# Patient Record
Sex: FEMALE | Race: WHITE | NOT HISPANIC OR LATINO
[De-identification: names, ages, dates, MRNs, and addresses within clinical notes are randomized per-mention and may not be internally consistent; named-entity substitution may affect disease eponyms.]

---
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
## 2019-01-22 ENCOUNTER — TRANSCRIPTION ENCOUNTER (OUTPATIENT)
Age: 19
End: 2019-01-22

## 2019-02-07 ENCOUNTER — APPOINTMENT (OUTPATIENT)
Dept: NEUROLOGY | Facility: CLINIC | Age: 19
End: 2019-02-07
Payer: COMMERCIAL

## 2019-02-07 VITALS
RESPIRATION RATE: 16 BRPM | HEIGHT: 61 IN | TEMPERATURE: 98 F | BODY MASS INDEX: 30.4 KG/M2 | DIASTOLIC BLOOD PRESSURE: 70 MMHG | HEART RATE: 67 BPM | OXYGEN SATURATION: 98 % | WEIGHT: 161 LBS | SYSTOLIC BLOOD PRESSURE: 100 MMHG

## 2019-02-07 DIAGNOSIS — G43.009 MIGRAINE W/OUT AURA, NOT INTRACTABLE, W/OUT STATUS MIGRAINOSUS: ICD-10-CM

## 2019-02-07 PROCEDURE — 99243 OFF/OP CNSLTJ NEW/EST LOW 30: CPT

## 2019-02-07 RX ORDER — DESOGESTREL AND ETHINYL ESTRADIOL 0.15-0.03
KIT ORAL
Refills: 0 | Status: ACTIVE | COMMUNITY

## 2019-02-07 NOTE — CONSULT LETTER
[Dear  ___] : Dear  [unfilled], [Consult Letter:] : I had the pleasure of evaluating your patient, [unfilled]. [Please see my note below.] : Please see my note below. [Consult Closing:] : Thank you very much for allowing me to participate in the care of this patient.  If you have any questions, please do not hesitate to contact me. [Sincerely,] : Sincerely, [FreeTextEntry2] : Yu Benavides MD\[place] [address]

## 2019-02-07 NOTE — HISTORY OF PRESENT ILLNESS
[FreeTextEntry1] :  18-year-old woman with history of recurrent throbbing headaches since childhood. Headaches are often of left retro-orbital and associated with photophobia and phonophobia. Excedrin helps. In the past to 2 months she has had 10 headaches. She has noted headaches are more frequent with increased stress from school (sophomore at  [employer], majoring in biology).\par \par At age 13 she fell from her horse and fractured lumbar spine. 2 months ago she had a L5-S1 herniated disc and underwent a lumbar laminectomy performed by Dr. Miller.\par \par She has a history of asthma controlled with Symbicort. She underwent a sinuplasty about 2 years ago. She has been on oral contraceptives for the past year. She noted no difference in frequency of headaches on the oral contraceptive.\par \par Family history reveals mother has recurrent headaches.

## 2019-02-07 NOTE — PHYSICAL EXAM
[Person] : oriented to person [Place] : oriented to place [Time] : oriented to time [Concentration Intact] : normal concentrating ability [Visual Intact] : visual attention was ~T not ~L decreased [Naming Objects] : no difficulty naming common objects [Repeating Phrases] : no difficulty repeating a phrase [Writing A Sentence] : no difficulty writing a sentence [Fluency] : fluency intact [Comprehension] : comprehension intact [Reading] : reading intact [Past History] : adequate knowledge of personal past history [Cranial Nerves Optic (II)] : visual acuity intact bilaterally,  visual fields full to confrontation, pupils equal round and reactive to light [Cranial Nerves Oculomotor (III)] : extraocular motion intact [Cranial Nerves Trigeminal (V)] : facial sensation intact symmetrically [Cranial Nerves Facial (VII)] : face symmetrical [Cranial Nerves Vestibulocochlear (VIII)] : hearing was intact bilaterally [Cranial Nerves Glossopharyngeal (IX)] : tongue and palate midline [Cranial Nerves Accessory (XI - Cranial And Spinal)] : head turning and shoulder shrug symmetric [Cranial Nerves Hypoglossal (XII)] : there was no tongue deviation with protrusion [Motor Tone] : muscle tone was normal in all four extremities [Motor Strength] : muscle strength was normal in all four extremities [No Muscle Atrophy] : normal bulk in all four extremities [Sensation Tactile Decrease] : light touch was intact [Abnormal Walk] : normal gait [Balance] : balance was intact [Past-pointing] : there was no past-pointing [Tremor] : no tremor present [2+] : Ankle jerk left 2+ [Plantar Reflex Right Only] : normal on the right [Plantar Reflex Left Only] : normal on the left [Optic Disc Abnormality] : the optic disc were normal in size and color [FreeTextEntry1] : sharp disk margins [] : the neck was supple [Heart Rate And Rhythm] : heart rate was normal and rhythm regular [Heart Sounds] : normal S1 and S2 [Heart Sounds Gallop] : no gallops [Murmurs] : no murmurs [Heart Sounds Pericardial Friction Rub] : no pericardial rub

## 2019-02-07 NOTE — ASSESSMENT
[FreeTextEntry1] : I reviewed migraine triggers and avoidance with patient. Will start coenzyme Q 10 200 mg daily for migraine prophylaxis. For any recurrence of headache she will take sumatriptan 100 mg tablet at onset of headache. Potential adverse effects reviewed. She will contact me if she has any adverse effects.\par \par Return for followup in 3 months.

## 2019-05-13 ENCOUNTER — APPOINTMENT (OUTPATIENT)
Dept: NEUROLOGY | Facility: CLINIC | Age: 19
End: 2019-05-13

## 2019-08-11 ENCOUNTER — EMERGENCY (EMERGENCY)
Facility: HOSPITAL | Age: 19
LOS: 1 days | Discharge: HOME | End: 2019-08-11
Attending: EMERGENCY MEDICINE | Admitting: EMERGENCY MEDICINE
Payer: COMMERCIAL

## 2019-08-11 VITALS
HEART RATE: 63 BPM | HEIGHT: 61 IN | RESPIRATION RATE: 17 BRPM | OXYGEN SATURATION: 100 % | SYSTOLIC BLOOD PRESSURE: 148 MMHG | DIASTOLIC BLOOD PRESSURE: 95 MMHG | TEMPERATURE: 98 F | WEIGHT: 145.06 LBS

## 2019-08-11 DIAGNOSIS — Z98.890 OTHER SPECIFIED POSTPROCEDURAL STATES: Chronic | ICD-10-CM

## 2019-08-11 PROCEDURE — 99283 EMERGENCY DEPT VISIT LOW MDM: CPT

## 2019-08-11 RX ORDER — METHOCARBAMOL 500 MG/1
2 TABLET, FILM COATED ORAL
Qty: 24 | Refills: 0
Start: 2019-08-11 | End: 2019-08-14
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
## 2019-08-11 NOTE — ED PROVIDER NOTE - NSCAREINITIATED _GEN_ER
Ana Benavidez(Resident) Full range of motion of upper and lower extremities, no joint tenderness/swelling.
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
## 2019-08-11 NOTE — ED PROVIDER NOTE - PHYSICAL EXAMINATION
Gen: AAOx3, NAD   Head: NCAT  ENT: Airway patent, moist mucous membranes, nasal passageways clear  Cardiac: Normal rate, normal rhythm, no murmurs/rubs/gallops appreciated  Respiratory: Lungs CTA B/L  Gastrointestinal: Abdomen soft, nontender, nondistended, no rebound, no guarding  MSK: No gross abnormalities, FROM of all four extremities, no edema, intact AROM of neck, no midline c-t-l spine ttp, + ttp left paraspinal cervical ttp and left trapezius ttp   HEME: Extremities warm, pulses intact and symmetrical in all four extremities  Skin: left lateral neck rash (pt states is chronic)   Neuro: No gross neurologic deficits

## 2021-01-20 ENCOUNTER — TRANSCRIPTION ENCOUNTER (OUTPATIENT)
Age: 21
End: 2021-01-20

## 2022-09-29 ENCOUNTER — NON-APPOINTMENT (OUTPATIENT)
Age: 22
End: 2022-09-29

## 2023-01-19 ENCOUNTER — APPOINTMENT (OUTPATIENT)
Dept: PAIN MANAGEMENT | Facility: CLINIC | Age: 23
End: 2023-01-19
Payer: COMMERCIAL

## 2023-01-19 ENCOUNTER — APPOINTMENT (OUTPATIENT)
Dept: PAIN MANAGEMENT | Facility: CLINIC | Age: 23
End: 2023-01-19

## 2023-01-19 VITALS
WEIGHT: 175 LBS | HEIGHT: 61 IN | DIASTOLIC BLOOD PRESSURE: 88 MMHG | BODY MASS INDEX: 33.04 KG/M2 | SYSTOLIC BLOOD PRESSURE: 139 MMHG | HEART RATE: 78 BPM

## 2023-01-19 PROCEDURE — 99215 OFFICE O/P EST HI 40 MIN: CPT

## 2023-01-19 PROCEDURE — ZZZZZ: CPT

## 2023-01-19 NOTE — ASSESSMENT
[FreeTextEntry1] : Chronic migraine\par Possible rebound\par Asthma by hx\par Non focal exam\par \par Would rec Nurtec qod and Aleve as abortive\par Educated patient on rebound.

## 2023-01-19 NOTE — HISTORY OF PRESENT ILLNESS
[FreeTextEntry1] : This is a case of a   22 -year-old right-handed female with a past medical history of asthma, GERD, chronic back pain     who presents with a chief complaint of HEADACHES.\par \par Patient reports headaches since childhood usually at a frequency of once per month. Over time, the headaches have increased to qd especially worse in summer. Currently, the headaches are 4-5 times per week \par Often wake up with them. 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 , prema, riza, eletriptan,\par Triggers: None known\par Comorbidities: intermittent anxious. \par Own horse for fun.\par Other pain conditions: None\par Imaging: MRI brain past 1-2 years. NA.\par Medications: Daily inhaler, BCP, PEPCID\par Interventions: None\par CAM therapies: None\par Family history: Noncontributory\par Allergies: NKA\par \par

## 2023-01-19 NOTE — PHYSICAL EXAM
[FreeTextEntry1] : Constitutional: No signs of distress or signs of toxicity. \par Mental Status: Alert and well oriented. Speech fluent. No aphasia. Fund of knowledge intact. \par Psychiatric: Mood stable.\par Cranial Nerve: PERRLA: No papilledema; No VFC: No Mosse. V1-3 intact. No facial asymmetry, hearing grossly intact; palate elevates symmetrically, tongue midline\par Motor:No involuntary movements noted.  Adequate bulk, tone throughout, 5/5 strength of all muscle groups \par DTR: present and symmetrical; no clonus, plantars  downgoing\par Sensory: intact to primary and secondary modalities; neg Romberg\par Cerebellar: adequate finger to nose and eel to shin bilaterally.\par Gait: non antalgic or ataxic.\par Eyes: no redness or swelling\par HEENT: intact; no signs of trauma.\par Neck: No masses noted\par Pulmonary: no respiratory distress\par Vascular: no temperature, color change or sudomotor changes.; no edema\par Musculoskeletal: examination of the cervical spine reveals no midline tenderness, range of motion full upon flexion, extension and lateral rotation. Negative facet tenderness, Negative Spurlings bilaterally. \par Skin: No rash.\par

## 2023-03-06 ENCOUNTER — OUTPATIENT (OUTPATIENT)
Dept: OUTPATIENT SERVICES | Facility: HOSPITAL | Age: 23
LOS: 1 days | End: 2023-03-06
Payer: COMMERCIAL

## 2023-03-06 VITALS
DIASTOLIC BLOOD PRESSURE: 88 MMHG | OXYGEN SATURATION: 97 % | HEIGHT: 61 IN | RESPIRATION RATE: 18 BRPM | WEIGHT: 186.95 LBS | SYSTOLIC BLOOD PRESSURE: 140 MMHG | TEMPERATURE: 98 F | HEART RATE: 84 BPM

## 2023-03-06 DIAGNOSIS — Z98.1 ARTHRODESIS STATUS: Chronic | ICD-10-CM

## 2023-03-06 DIAGNOSIS — Z98.890 OTHER SPECIFIED POSTPROCEDURAL STATES: Chronic | ICD-10-CM

## 2023-03-06 DIAGNOSIS — Z01.818 ENCOUNTER FOR OTHER PREPROCEDURAL EXAMINATION: ICD-10-CM

## 2023-03-06 DIAGNOSIS — Z11.52 ENCOUNTER FOR SCREENING FOR COVID-19: ICD-10-CM

## 2023-03-06 DIAGNOSIS — M79.671 PAIN IN RIGHT FOOT: ICD-10-CM

## 2023-03-06 DIAGNOSIS — M21.611 BUNION OF RIGHT FOOT: ICD-10-CM

## 2023-03-06 DIAGNOSIS — M20.11 HALLUX VALGUS (ACQUIRED), RIGHT FOOT: ICD-10-CM

## 2023-03-06 LAB
HCT VFR BLD CALC: 45.1 % — HIGH (ref 34.5–45)
HGB BLD-MCNC: 14 G/DL — SIGNIFICANT CHANGE UP (ref 11.5–15.5)
MCHC RBC-ENTMCNC: 25.7 PG — LOW (ref 27–34)
MCHC RBC-ENTMCNC: 31 GM/DL — LOW (ref 32–36)
MCV RBC AUTO: 82.8 FL — SIGNIFICANT CHANGE UP (ref 80–100)
NRBC # BLD: 0 /100 WBCS — SIGNIFICANT CHANGE UP (ref 0–0)
PLATELET # BLD AUTO: 278 K/UL — SIGNIFICANT CHANGE UP (ref 150–400)
RBC # BLD: 5.45 M/UL — HIGH (ref 3.8–5.2)
RBC # FLD: 14.4 % — SIGNIFICANT CHANGE UP (ref 10.3–14.5)
SARS-COV-2 RNA SPEC QL NAA+PROBE: SIGNIFICANT CHANGE UP
WBC # BLD: 8.15 K/UL — SIGNIFICANT CHANGE UP (ref 3.8–10.5)
WBC # FLD AUTO: 8.15 K/UL — SIGNIFICANT CHANGE UP (ref 3.8–10.5)

## 2023-03-06 PROCEDURE — 85027 COMPLETE CBC AUTOMATED: CPT

## 2023-03-06 PROCEDURE — U0005: CPT

## 2023-03-06 PROCEDURE — G0463: CPT

## 2023-03-06 PROCEDURE — C9803: CPT

## 2023-03-06 PROCEDURE — U0003: CPT

## 2023-03-06 RX ORDER — SODIUM CHLORIDE 9 MG/ML
3 INJECTION INTRAMUSCULAR; INTRAVENOUS; SUBCUTANEOUS EVERY 8 HOURS
Refills: 0 | Status: DISCONTINUED | OUTPATIENT
Start: 2023-03-09 | End: 2023-03-23

## 2023-03-06 NOTE — H&P PST ADULT - NSICDXPASTMEDICALHX_GEN_ALL_CORE_FT
PAST MEDICAL HISTORY:  Bunion of right foot     Frequent headaches     GERD (gastroesophageal reflux disease)     Hallux valgus (acquired), right foot     Pain in right foot

## 2023-03-06 NOTE — H&P PST ADULT - NSICDXPASTSURGICALHX_GEN_ALL_CORE_FT
PAST SURGICAL HISTORY:  History of back surgery     History of lumbar fusion     History of sinus surgery     Status post bilateral breast reduction     Status post lumbar microdiscectomy

## 2023-03-06 NOTE — H&P PST ADULT - PROBLEM SELECTOR PLAN 1
Scheduled for sx on 3/9/23. Surgical and chlorhexidine instructions reviewed w/ pt. COVID testing to be done at [facility] today.

## 2023-03-06 NOTE — H&P PST ADULT - HISTORY OF PRESENT ILLNESS
22 year-old right-handed female with PMH of asthma, GERD, chronic back pain (lumbar sx x3) and headaches who presents today to [facility] for a scheduled Right Foot Lapidus Bunionectomy on 3/9/23. Denies known hx of COVID or recent fevers, chills, cough, chest pain or SOB and feels well otherwise. COVID testing to be completed in [facility] today.

## 2023-03-06 NOTE — H&P PST ADULT - NSICDXFAMILYHX_GEN_ALL_CORE_FT
FAMILY HISTORY:  Father  Still living? No  Family history of CABG, Age at diagnosis: Age Unknown  Family history of lung cancer, Age at diagnosis: Age Unknown  Family history of thyroid cancer, Age at diagnosis: Age Unknown  FHx: kidney failure, Age at diagnosis: Age Unknown    Mother  Still living? Yes, Estimated age: Age Unknown  Family history of hyperlipidemia, Age at diagnosis: Age Unknown  Family hx of hypertension, Age at diagnosis: Age Unknown

## 2023-03-08 ENCOUNTER — TRANSCRIPTION ENCOUNTER (OUTPATIENT)
Age: 23
End: 2023-03-08

## 2023-03-09 ENCOUNTER — TRANSCRIPTION ENCOUNTER (OUTPATIENT)
Age: 23
End: 2023-03-09

## 2023-03-09 ENCOUNTER — OUTPATIENT (OUTPATIENT)
Dept: OUTPATIENT SERVICES | Facility: HOSPITAL | Age: 23
LOS: 1 days | End: 2023-03-09
Payer: COMMERCIAL

## 2023-03-09 ENCOUNTER — RESULT REVIEW (OUTPATIENT)
Age: 23
End: 2023-03-09

## 2023-03-09 VITALS
RESPIRATION RATE: 16 BRPM | TEMPERATURE: 98 F | SYSTOLIC BLOOD PRESSURE: 137 MMHG | HEART RATE: 82 BPM | DIASTOLIC BLOOD PRESSURE: 90 MMHG | OXYGEN SATURATION: 97 %

## 2023-03-09 VITALS
RESPIRATION RATE: 16 BRPM | SYSTOLIC BLOOD PRESSURE: 133 MMHG | HEIGHT: 61 IN | TEMPERATURE: 98 F | WEIGHT: 186.95 LBS | DIASTOLIC BLOOD PRESSURE: 89 MMHG | HEART RATE: 80 BPM | OXYGEN SATURATION: 98 %

## 2023-03-09 DIAGNOSIS — M79.671 PAIN IN RIGHT FOOT: ICD-10-CM

## 2023-03-09 DIAGNOSIS — Z98.890 OTHER SPECIFIED POSTPROCEDURAL STATES: Chronic | ICD-10-CM

## 2023-03-09 DIAGNOSIS — M21.611 BUNION OF RIGHT FOOT: ICD-10-CM

## 2023-03-09 DIAGNOSIS — Z98.1 ARTHRODESIS STATUS: Chronic | ICD-10-CM

## 2023-03-09 DIAGNOSIS — M20.11 HALLUX VALGUS (ACQUIRED), RIGHT FOOT: ICD-10-CM

## 2023-03-09 PROCEDURE — C1889: CPT

## 2023-03-09 PROCEDURE — 28297 COR HLX VLGS JT ARTHRD: CPT | Mod: RT

## 2023-03-09 PROCEDURE — 76000 FLUOROSCOPY <1 HR PHYS/QHP: CPT

## 2023-03-09 PROCEDURE — C1734: CPT

## 2023-03-09 PROCEDURE — 73630 X-RAY EXAM OF FOOT: CPT

## 2023-03-09 PROCEDURE — C1713: CPT

## 2023-03-09 PROCEDURE — 73630 X-RAY EXAM OF FOOT: CPT | Mod: 26,RT

## 2023-03-09 DEVICE — IMPLANTABLE DEVICE: Type: IMPLANTABLE DEVICE | Site: RIGHT | Status: FUNCTIONAL

## 2023-03-09 DEVICE — SCREW LOKG 3.5X20MM: Type: IMPLANTABLE DEVICE | Site: RIGHT | Status: FUNCTIONAL

## 2023-03-09 DEVICE — SCREW LOGK 3.5X22MM: Type: IMPLANTABLE DEVICE | Site: RIGHT | Status: FUNCTIONAL

## 2023-03-09 DEVICE — SCREW LOKG 3.5X18MM: Type: IMPLANTABLE DEVICE | Site: RIGHT | Status: FUNCTIONAL

## 2023-03-09 DEVICE — GWIRE MAXTORQUE 1.4MM: Type: IMPLANTABLE DEVICE | Site: RIGHT | Status: FUNCTIONAL

## 2023-03-09 DEVICE — BONE WAX 2.5GM: Type: IMPLANTABLE DEVICE | Site: RIGHT | Status: FUNCTIONAL

## 2023-03-09 DEVICE — K-WIRE STRYKER (SMOOTH) 4MM: Type: IMPLANTABLE DEVICE | Site: RIGHT | Status: FUNCTIONAL

## 2023-03-09 DEVICE — K-WIRE WRIGHT MEDICAL 2.5MM X 150MM: Type: IMPLANTABLE DEVICE | Site: RIGHT | Status: FUNCTIONAL

## 2023-03-09 DEVICE — SCREW CORT LO PROF 3.5X24MM: Type: IMPLANTABLE DEVICE | Site: RIGHT | Status: FUNCTIONAL

## 2023-03-09 DEVICE — PIN FIXATION TEMP 1.1MM SM: Type: IMPLANTABLE DEVICE | Site: RIGHT | Status: FUNCTIONAL

## 2023-03-09 DEVICE — GRAFT BONE AUGMENT INJ 3ML SYNTHETIC: Type: IMPLANTABLE DEVICE | Site: RIGHT | Status: FUNCTIONAL

## 2023-03-09 DEVICE — SCREW LOKG 3.5X14MM: Type: IMPLANTABLE DEVICE | Site: RIGHT | Status: FUNCTIONAL

## 2023-03-09 RX ORDER — DESOGESTREL AND ETHINYL ESTRADIOL 0.15-0.03
1 KIT ORAL
Qty: 0 | Refills: 0 | DISCHARGE

## 2023-03-09 RX ORDER — HYDROMORPHONE HYDROCHLORIDE 2 MG/ML
0.5 INJECTION INTRAMUSCULAR; INTRAVENOUS; SUBCUTANEOUS
Refills: 0 | Status: DISCONTINUED | OUTPATIENT
Start: 2023-03-09 | End: 2023-03-09

## 2023-03-09 RX ORDER — CEFAZOLIN SODIUM 1 G
2000 VIAL (EA) INJECTION ONCE
Refills: 0 | Status: COMPLETED | OUTPATIENT
Start: 2023-03-09 | End: 2023-03-09

## 2023-03-09 RX ORDER — RIMEGEPANT SULFATE 75 MG/75MG
1 TABLET, ORALLY DISINTEGRATING ORAL
Qty: 0 | Refills: 0 | DISCHARGE

## 2023-03-09 RX ORDER — FLUTICASONE FUROATE AND VILANTEROL TRIFENATATE 100; 25 UG/1; UG/1
1 POWDER RESPIRATORY (INHALATION)
Qty: 0 | Refills: 0 | DISCHARGE

## 2023-03-09 RX ORDER — FAMOTIDINE 10 MG/ML
1 INJECTION INTRAVENOUS
Qty: 0 | Refills: 0 | DISCHARGE

## 2023-03-09 RX ORDER — ONDANSETRON 8 MG/1
4 TABLET, FILM COATED ORAL ONCE
Refills: 0 | Status: DISCONTINUED | OUTPATIENT
Start: 2023-03-09 | End: 2023-03-23

## 2023-03-09 RX ORDER — LEVALBUTEROL 1.25 MG/.5ML
2 SOLUTION, CONCENTRATE RESPIRATORY (INHALATION)
Qty: 0 | Refills: 0 | DISCHARGE

## 2023-03-09 RX ORDER — CHLORHEXIDINE GLUCONATE 213 G/1000ML
1 SOLUTION TOPICAL ONCE
Refills: 0 | Status: COMPLETED | OUTPATIENT
Start: 2023-03-09 | End: 2023-03-09

## 2023-03-09 RX ORDER — LIDOCAINE HCL 20 MG/ML
0.2 VIAL (ML) INJECTION ONCE
Refills: 0 | Status: COMPLETED | OUTPATIENT
Start: 2023-03-09 | End: 2023-03-09

## 2023-03-09 RX ADMIN — SODIUM CHLORIDE 3 MILLILITER(S): 9 INJECTION INTRAMUSCULAR; INTRAVENOUS; SUBCUTANEOUS at 09:15

## 2023-03-09 RX ADMIN — CHLORHEXIDINE GLUCONATE 1 APPLICATION(S): 213 SOLUTION TOPICAL at 09:09

## 2023-03-09 RX ADMIN — HYDROMORPHONE HYDROCHLORIDE 0.5 MILLIGRAM(S): 2 INJECTION INTRAMUSCULAR; INTRAVENOUS; SUBCUTANEOUS at 13:23

## 2023-03-09 NOTE — ASU DISCHARGE PLAN (ADULT/PEDIATRIC) - NURSING INSTRUCTIONS
OK to take Tylenol/Acetaminophen at/after 04:50PM______ for pain and every 6 hours after as needed. OK to take Motrin/Ibuprofen _ for pain and every 6 hours after as needed, if no contraindications present.

## 2023-03-09 NOTE — ASU PREOP CHECKLIST - BP NONINVASIVE SYSTOLIC (MM HG)
Next appt NONE  Last appt 9-28-17    Refill request for  Prednisone 2.5mg Â   Take 3 tablets by mouth 1 time daily  # 90    Refill unable to be completed per standing protocol due to; NON PROTOCOL MEDICATION. MEDICATION NOT ON CURRENT MED LIST. Request routed to provider nurse pool. Please route any notes back to your nursing pool via patient call NOT Rx Auth. Thank you, Refill Center Staff      Refill request for;   Disp Refills Start End    metoPROLOL (LOPRESSOR) 25 MG tablet 90 tablet 1 5/26/2017     Sig: Take 1 po daily       Disp Refills Start End    montelukast (SINGULAIR) 10 MG tablet 90 tablet 1 5/26/2017     Sig - Route: Take 1 tablet by mouth nightly. - Oral      Refilled per standing med protocol. 133

## 2023-03-09 NOTE — ASU DISCHARGE PLAN (ADULT/PEDIATRIC) - ASU DC SPECIAL INSTRUCTIONSFT
- stay non weight bearing to right foot in a posterior splint  - keep dressing clean, dry and intact till follow up  - follow up with Dr. Monroy after 1 week

## 2023-03-09 NOTE — ASU PATIENT PROFILE, ADULT - FALL HARM RISK - UNIVERSAL INTERVENTIONS
Bed in lowest position, wheels locked, appropriate side rails in place/Call bell, personal items and telephone in reach/Instruct patient to call for assistance before getting out of bed or chair/Non-slip footwear when patient is out of bed/[place] to call system/Physically safe environment - no spills, clutter or unnecessary equipment/Purposeful Proactive Rounding/Room/bathroom lighting operational, light cord in reach

## 2023-03-09 NOTE — PRE-ANESTHESIA EVALUATION ADULT - NSANTHPMHFT_GEN_ALL_CORE
22 year-old right-handed female with PMH of asthma, GERD, chronic back pain (lumbar sx x3) and headaches who presents today to [facility] for a scheduled Right Foot Lapidus Bunionectomy on 3/9/23. Denies known hx of COVID or recent fevers, chills, cough, chest pain or SOB and feels well otherwise. COVID testing to be completed in [facility] today.

## 2023-03-09 NOTE — ASU PREOP CHECKLIST - BSA (M2)
1.84
pt feeling better. plan dc home today on cipro/flagyl 7 days total  plan outpt gi followup tomorrow

## 2023-03-27 ENCOUNTER — RX RENEWAL (OUTPATIENT)
Age: 23
End: 2023-03-27

## 2023-03-30 ENCOUNTER — TRANSCRIPTION ENCOUNTER (OUTPATIENT)
Age: 23
End: 2023-03-30

## 2023-04-12 ENCOUNTER — NON-APPOINTMENT (OUTPATIENT)
Age: 23
End: 2023-04-12

## 2023-04-16 ENCOUNTER — NON-APPOINTMENT (OUTPATIENT)
Age: 23
End: 2023-04-16

## 2023-05-08 ENCOUNTER — APPOINTMENT (OUTPATIENT)
Dept: PAIN MANAGEMENT | Facility: CLINIC | Age: 23
End: 2023-05-08

## 2023-05-18 ENCOUNTER — RX RENEWAL (OUTPATIENT)
Age: 23
End: 2023-05-18

## 2023-06-21 ENCOUNTER — NON-APPOINTMENT (OUTPATIENT)
Age: 23
End: 2023-06-21

## 2024-04-24 ENCOUNTER — RX RENEWAL (OUTPATIENT)
Age: 24
End: 2024-04-24

## 2024-04-24 RX ORDER — RIMEGEPANT SULFATE 75 MG/75MG
75 TABLET, ORALLY DISINTEGRATING ORAL
Qty: 16 | Refills: 0 | Status: ACTIVE | COMMUNITY
Start: 2023-01-19 | End: 1900-01-01

## 2025-01-09 ENCOUNTER — APPOINTMENT (OUTPATIENT)
Dept: OBGYN | Facility: CLINIC | Age: 25
End: 2025-01-09

## 2025-01-09 VITALS
HEART RATE: 88 BPM | DIASTOLIC BLOOD PRESSURE: 98 MMHG | BODY MASS INDEX: 36.63 KG/M2 | SYSTOLIC BLOOD PRESSURE: 149 MMHG | WEIGHT: 194 LBS | HEIGHT: 61 IN

## 2025-01-09 DIAGNOSIS — Z01.419 ENCOUNTER FOR GYNECOLOGICAL EXAMINATION (GENERAL) (ROUTINE) W/OUT ABNORMAL FINDINGS: ICD-10-CM

## 2025-01-09 PROCEDURE — 99385 PREV VISIT NEW AGE 18-39: CPT

## 2025-01-09 PROCEDURE — 99459 PELVIC EXAMINATION: CPT

## 2025-01-09 RX ORDER — FAMOTIDINE 40 MG/1
TABLET, FILM COATED ORAL
Refills: 0 | Status: ACTIVE | COMMUNITY

## 2025-01-09 RX ORDER — RIMEGEPANT SULFATE 75 MG/75MG
TABLET, ORALLY DISINTEGRATING ORAL
Refills: 0 | Status: ACTIVE | COMMUNITY

## 2025-01-09 RX ORDER — BUPROPION HYDROCHLORIDE 100 MG/1
TABLET, FILM COATED ORAL
Refills: 0 | Status: ACTIVE | COMMUNITY

## 2025-01-09 RX ORDER — FLUTICASONE FUROATE AND VILANTEROL TRIFENATATE 200; 25 UG/1; UG/1
200-25 POWDER RESPIRATORY (INHALATION)
Refills: 0 | Status: ACTIVE | COMMUNITY

## 2025-01-09 RX ORDER — LISDEXAMFETAMINE DIMESYLATE 10 MG/1
CAPSULE ORAL
Refills: 0 | Status: ACTIVE | COMMUNITY

## 2025-01-09 RX ORDER — NORETHINDRONE 0.35 MG/1
0.35 TABLET ORAL DAILY
Qty: 3 | Refills: 3 | Status: ACTIVE | COMMUNITY
Start: 2025-01-09 | End: 1900-01-01

## 2025-01-10 LAB
C TRACH RRNA SPEC QL NAA+PROBE: NOT DETECTED
N GONORRHOEA RRNA SPEC QL NAA+PROBE: NOT DETECTED
SOURCE TP AMPLIFICATION: NORMAL

## 2025-01-14 LAB — CYTOLOGY CVX/VAG DOC THIN PREP: NORMAL

## 2025-02-04 ENCOUNTER — TRANSCRIPTION ENCOUNTER (OUTPATIENT)
Age: 25
End: 2025-02-04

## 2025-02-05 ENCOUNTER — TRANSCRIPTION ENCOUNTER (OUTPATIENT)
Age: 25
End: 2025-02-05

## 2025-03-21 PROBLEM — Z82.49 FAMILY HISTORY OF HYPERTENSION: Status: ACTIVE | Noted: 2025-03-21

## 2025-03-21 PROBLEM — Z87.09 HISTORY OF ASTHMA: Status: RESOLVED | Noted: 2025-03-21 | Resolved: 2025-03-21

## 2025-03-21 PROBLEM — F32.A DEPRESSION, CONTROLLED: Status: ACTIVE | Noted: 2025-03-21

## 2025-03-21 PROBLEM — Z80.8 FAMILY HISTORY OF MALIGNANT NEOPLASM OF THYROID: Status: ACTIVE | Noted: 2025-03-21

## 2025-03-21 PROBLEM — Z80.1 FAMILY HISTORY OF LUNG CANCER: Status: ACTIVE | Noted: 2025-03-21

## 2025-07-11 ENCOUNTER — TRANSCRIPTION ENCOUNTER (OUTPATIENT)
Age: 25
End: 2025-07-11

## 2025-07-21 ENCOUNTER — APPOINTMENT (OUTPATIENT)
Dept: OBGYN | Facility: CLINIC | Age: 25
End: 2025-07-21

## 2025-07-23 ENCOUNTER — TRANSCRIPTION ENCOUNTER (OUTPATIENT)
Age: 25
End: 2025-07-23

## 2025-07-25 ENCOUNTER — APPOINTMENT (OUTPATIENT)
Dept: OBGYN | Facility: CLINIC | Age: 25
End: 2025-07-25

## 2025-08-04 ENCOUNTER — TRANSCRIPTION ENCOUNTER (OUTPATIENT)
Age: 25
End: 2025-08-04

## (undated) DEVICE — YELLOW PIN COVER

## (undated) DEVICE — SOL IRR POUR H2O 250ML

## (undated) DEVICE — DRSG XEROFORM 1 X 8"

## (undated) DEVICE — GAUGE DEPTH 4MM BLUE

## (undated) DEVICE — TOURNIQUET CUFF 18" DUAL PORT DUAL BLADDER W PLC (BLACK)

## (undated) DEVICE — DRILL BIT STRYKER ORTHO CANN 2.6MM

## (undated) DEVICE — SAW BLADE STRYKER MICRO

## (undated) DEVICE — DRILL BIT WRIGHT MEDICAL 2.5MM

## (undated) DEVICE — SPECIMEN CONTAINER 100ML

## (undated) DEVICE — WRIGHT MEDICAL PREPARATION KIT

## (undated) DEVICE — DRSG STOCKINETTE TUBULAR COTTON 1PLY 6X72"

## (undated) DEVICE — VENODYNE/SCD SLEEVE CALF MEDIUM

## (undated) DEVICE — BLADE SCALPEL SAFETYLOCK #15

## (undated) DEVICE — SUT POLYSORB 2-0 30" V-20 UNDYED

## (undated) DEVICE — SAW BLADE STRYKER PRECISION THIN SAGITTAL MEDIUM 9MM X 25MM

## (undated) DEVICE — TOURNIQUET ESMARK 4"

## (undated) DEVICE — GLV 7.5 PROTEXIS (CREAM) NEU-THERA

## (undated) DEVICE — PACK MINOR

## (undated) DEVICE — SUT POLYSORB 4-0 18" P-12 UNDYED

## (undated) DEVICE — SUT MONOSOF 4-0 18" P-12

## (undated) DEVICE — Device

## (undated) DEVICE — DRSG ACE BANDAGE 3"

## (undated) DEVICE — SOL IRR POUR NS 0.9% 500ML

## (undated) DEVICE — WARMING BLANKET UPPER ADULT

## (undated) DEVICE — DRAPE SPLIT SHEET 77" X 108"

## (undated) DEVICE — SHOE  POSTOP SOFTIE DARCO M-M

## (undated) DEVICE — DRAPE MAYO STAND 23"

## (undated) DEVICE — POSITIONER FOAM EGG CRATE ULNAR 2PCS (PINK)

## (undated) DEVICE — DRAPE TOWEL BLUE 17" X 24"

## (undated) DEVICE — SUT POLYSORB 3-0 30" P-12 UNDYED

## (undated) DEVICE — BUR STRYKER EGG 4MM

## (undated) DEVICE — SHOE  POSTOP SOFTIE DARCO M-LG

## (undated) DEVICE — DRAPE 1/2 SHEET 40X57"

## (undated) DEVICE — DRAPE INSTRUMENT POUCH 6.75" X 11"

## (undated) DEVICE — SAW BLADE STRYKER MED NARROW 18X5.5MM

## (undated) DEVICE — SUT POLYSORB 3-0 18" P-12 UNDYED

## (undated) DEVICE — DRSG KLING 3"

## (undated) DEVICE — SUT MONOSOF 4-0 18" C-13

## (undated) DEVICE — SAW BLADE STRYKER PRECISION THIN MEDIUM NARROW BLADE 5.5MM X 18MM

## (undated) DEVICE — SHOE  POSTOP SOFTIE DARCO M-SM

## (undated) DEVICE — SAW BLADE STRYKER MED AGGRESSIVE 9X25X0.38MM